# Patient Record
Sex: FEMALE | Race: ASIAN | NOT HISPANIC OR LATINO | Employment: UNEMPLOYED | URBAN - METROPOLITAN AREA
[De-identification: names, ages, dates, MRNs, and addresses within clinical notes are randomized per-mention and may not be internally consistent; named-entity substitution may affect disease eponyms.]

---

## 2021-01-05 ENCOUNTER — TELEPHONE (OUTPATIENT)
Dept: SCHEDULING | Facility: IMAGING CENTER | Age: 66
End: 2021-01-05

## 2021-02-02 ENCOUNTER — TELEPHONE (OUTPATIENT)
Dept: MEDICAL GROUP | Facility: PHYSICIAN GROUP | Age: 66
End: 2021-02-02

## 2021-02-02 NOTE — TELEPHONE ENCOUNTER
NEW PATIENT VISIT PRE-VISIT PLANNING    1.  [employer] Patient is checked in Patient Demographics?Yes    2.  Immunizations were updated in Epic using Reconcile Outside Information activity? No         3.  Is this appointment scheduled as a Hospital Follow-Up? No    4.  Patient is due for the following Health Maintenance Topics:   Health Maintenance Due   Topic Date Due   • HEPATITIS C SCREENING  1955   • PAP SMEAR  04/25/1978   • MAMMOGRAM  04/25/1997   • COLONOSCOPY  04/25/2007           5.  Reviewed/Updated the following with patient:       •   Preferred Pharmacy? Yes       •   Preferred Lab? Yes       •   Preferred Communication? Yes       •   Allergies? Yes       •   Medications? NO       •   Social History? Yes       •   Family History (document living status of immediate family members and if + hx of  cancer, diabetes, hypertension, hyperlipidemia, heart attack, stroke) Yes    6.  Updated Care Team?       •   DME Company (gait device, O2, CPAP, etc.) N\A       •   Other Specialists (eye doctor, derm, GYN, cardiology, endo, etc): N\A    7.  AHA (Puls[employer]) form printed for Provider? N/A

## 2021-02-04 ENCOUNTER — OFFICE VISIT (OUTPATIENT)
Dept: MEDICAL GROUP | Facility: PHYSICIAN GROUP | Age: 66
End: 2021-02-04
Payer: COMMERCIAL

## 2021-02-04 VITALS
HEIGHT: 61 IN | RESPIRATION RATE: 16 BRPM | DIASTOLIC BLOOD PRESSURE: 60 MMHG | BODY MASS INDEX: 22.84 KG/M2 | HEART RATE: 62 BPM | SYSTOLIC BLOOD PRESSURE: 100 MMHG | TEMPERATURE: 98.7 F | OXYGEN SATURATION: 98 % | WEIGHT: 121 LBS

## 2021-02-04 DIAGNOSIS — D69.6 THROMBOCYTOPENIA (HCC): ICD-10-CM

## 2021-02-04 DIAGNOSIS — E78.1 HYPERTRIGLYCERIDEMIA: ICD-10-CM

## 2021-02-04 DIAGNOSIS — R73.01 ELEVATED FASTING BLOOD SUGAR: ICD-10-CM

## 2021-02-04 DIAGNOSIS — Z78.0 POSTMENOPAUSAL ESTROGEN DEFICIENCY: ICD-10-CM

## 2021-02-04 DIAGNOSIS — Z11.59 SCREENING FOR VIRAL DISEASE: ICD-10-CM

## 2021-02-04 PROCEDURE — 99204 OFFICE O/P NEW MOD 45 MIN: CPT | Performed by: PHYSICIAN ASSISTANT

## 2021-02-04 ASSESSMENT — PATIENT HEALTH QUESTIONNAIRE - PHQ9: CLINICAL INTERPRETATION OF PHQ2 SCORE: 0

## 2021-02-04 NOTE — PROGRESS NOTES
Chief Complaint   Patient presents with   • Establish Care       HISTORY OF THE PRESENT ILLNESS: Briana Ochoa is a 65 y.o. female new patient to our practice. This pleasant patient is here today to establish care and to discuss the evaluation and management of:    Hypertriglyceridemia  Patient states her last lab work was completed 1 year ago indicated she had triglycerides in the 300 range.  She tells me that her diet could improve and she does not have regular exercise routine.  States she is more carbohydrates than she should.  Medication was not prescribed.    Elevated fasting blood sugar  States 2 years ago she was she had an elevated fasting glucose level.  She denies polyuria, polydipsia, poor wound healing, vision changes, peripheral neuropathy.  She is on carbohydrates than she should.    Thrombocytopenia (HCC)  Chronic but stable problem.  Patient states she has always had low platelets.  Denies alcohol use.  Denies chronic NSAID or aspirin use.  Patient is asymptomatic.    Postmenopausal estrogen deficiency  Patient is postmenopausal.  She denies taking over-the-counter vitamin D or calcium limitation.  She feels she gets adequate calcium in her diet.  Denies recurrent fractures.    Screening for viral disease  Pt would like to be screened for hep C.  She tells me that her mother has positive for hep C and lives within her household.  States she does help her mother monitor fasting blood glucose levels.  Patient is asymptomatic.  She meets CDC criteria to be screened.        History reviewed. No pertinent past medical history.    History reviewed. No pertinent surgical history.    Family Status   Relation Name Status   • Mo  Alive   • Fa     • Son  Alive   • Courtney  Alive     Family History   Problem Relation Age of Onset   • Heart Disease Mother    • Diabetes Father    • No Known Problems Son    • No Known Problems Daughter        Social History     Tobacco Use   • Smoking status: Never Smoker   •  "Smokeless tobacco: Never Used   Substance Use Topics   • Alcohol use: Not Currently   • Drug use: Not Currently       Allergies: Patient has no known allergies.    No current [facility]-ordered outpatient medications on file.     No current [facility]-ordered facility-administered medications on file.        Review of Systems   Constitutional: Negative for fever, chills, weight loss and malaise/fatigue.   HENT: Negative for ear pain, nosebleeds, congestion, sore throat and neck pain.    Eyes: Negative for blurred vision.   Respiratory: Negative for cough, sputum production, shortness of breath and wheezing.    Cardiovascular: Negative for chest pain, palpitations, orthopnea and leg swelling.   Gastrointestinal: Negative for heartburn, nausea, vomiting and abdominal pain.   Genitourinary: Negative for dysuria, urgency and frequency.   Musculoskeletal: Negative for myalgias, back pain and joint pain.   Skin: Negative for rash and itching.   Neurological: Negative for dizziness, tingling, tremors, sensory change, focal weakness and headaches.   Endo/Heme/Allergies: Does not bruise/bleed easily.   Psychiatric/Behavioral: Negative for depression, anxiety, or memory loss.     All other systems reviewed and are negative except as in HPI.    Exam: /60 (BP Location: Left arm, Patient Position: Sitting, BP Cuff Size: Adult)   Pulse 62   Temp 37.1 °C (98.7 °F) (Temporal)   Resp 16   Ht 1.549 m (5' 1\")   Wt 54.9 kg (121 lb)   SpO2 98%  Body mass index is 22.86 kg/m².  General: Normal appearing. No distress.  HEENT: Normocephalic. Eyes conjunctiva clear lids without ptosis, ears normal shape and contour.  Neck: Supple without JVD. Thyroid is not enlarged.  Pulmonary: Clear to ausculation.  Normal effort. No rales, ronchi, or wheezing.  Cardiovascular: Regular rate and rhythm without murmur.   Abdomen: Nondistended.  Neurologic: Grossly nonfocal  Skin: Warm and dry.  No obvious lesions.  Musculoskeletal: Normal gait. No extremity " cyanosis, clubbing, or edema.  Psych: Normal mood and affect. Alert and oriented x3. Judgment and insight is normal.      Medical decision-making and discussion:  1. Hypertriglyceridemia  Chronic problem.  Unknown if stable.  Lab work has been ordered.  Patient follow-up in 6 weeks discuss lab work results.    - Encouraged diet high in fruits, vegetables, and fiber. And a diet low in salt, refined carbohydrates, cholesterol, saturated fat, and trans fatty acids.    - Encouraged  a minimum of 30 minutes of moderate intensity aerobic exercise (eg, brisk walking) is recommended on five days each week. Or 20 minutes of vigorous-intensity aerobic exercise (eg, jogging) on three days each week.       - Comp Metabolic Panel; Future  - CBC WITH DIFFERENTIAL; Future  - Lipid Profile; Future    2. Elevated fasting blood sugar  Chronic problem.  Unknown if stable.  Lab work has been ordered.  Patient follow-up in 6 weeks discuss lab work results.    - Encouraged diet high in fruits, vegetables, and fiber. And a diet low in salt, refined carbohydrates, cholesterol, saturated fat, and trans fatty acids.    - Encouraged  a minimum of 30 minutes of moderate intensity aerobic exercise (eg, brisk walking) is recommended on five days each week. Or 20 minutes of vigorous-intensity aerobic exercise (eg, jogging) on three days each week.     - Comp Metabolic Panel; Future  - CBC WITH DIFFERENTIAL; Future  - Lipid Profile; Future  - HEMOGLOBIN A1C; Future    3. Thrombocytopenia (HCC)  Chronic but stable problem.  Lab work has been ordered.  Patient follow-up in 6 weeks discuss lab work results.  - CBC WITH DIFFERENTIAL; Future    4. Postmenopausal estrogen deficiency  Advised patient take over-the-counter 1-2000 units of vitamin D once daily.  She is not any 1200 mg of calcium in her diet to supplement with 500-1000 mg of calcium.  Continue weightbearing exercises.  Continue work on diet exercise.  Continue to monitor    - VITAMIN D,25  HYDROXY; Future    5. Screening for viral disease  Patient meets CDC criteria to be screened for hep C.  - HEP C VIRUS ANTIBODY; Future      Please note that this dictation was created using voice recognition software. I have made every reasonable attempt to correct obvious errors, but I expect that there are errors of grammar and possibly content that I did not discover before finalizing the note.      Assessment/Plan  1. Hypertriglyceridemia  Comp Metabolic Panel    CBC WITH DIFFERENTIAL    Lipid Profile   2. Elevated fasting blood sugar  Comp Metabolic Panel    CBC WITH DIFFERENTIAL    Lipid Profile    HEMOGLOBIN A1C   3. Thrombocytopenia (HCC)  CBC WITH DIFFERENTIAL   4. Postmenopausal estrogen deficiency  VITAMIN D,25 HYDROXY   5. Screening for viral disease  HEP C VIRUS ANTIBODY       No follow-ups on file.

## 2021-02-05 ENCOUNTER — HOSPITAL ENCOUNTER (OUTPATIENT)
Dept: LAB | Facility: MEDICAL CENTER | Age: 66
End: 2021-02-05
Attending: PHYSICIAN ASSISTANT
Payer: COMMERCIAL

## 2021-02-05 DIAGNOSIS — R73.01 ELEVATED FASTING BLOOD SUGAR: ICD-10-CM

## 2021-02-05 DIAGNOSIS — Z78.0 POSTMENOPAUSAL ESTROGEN DEFICIENCY: ICD-10-CM

## 2021-02-05 DIAGNOSIS — D69.6 THROMBOCYTOPENIA (HCC): ICD-10-CM

## 2021-02-05 DIAGNOSIS — E78.1 HYPERTRIGLYCERIDEMIA: ICD-10-CM

## 2021-02-05 DIAGNOSIS — Z11.59 SCREENING FOR VIRAL DISEASE: ICD-10-CM

## 2021-02-05 LAB
25(OH)D3 SERPL-MCNC: 56 NG/ML (ref 30–100)
ALBUMIN SERPL BCP-MCNC: 4.4 G/DL (ref 3.2–4.9)
ALBUMIN/GLOB SERPL: 1.3 G/DL
ALP SERPL-CCNC: 70 U/L (ref 30–99)
ALT SERPL-CCNC: 34 U/L (ref 2–50)
ANION GAP SERPL CALC-SCNC: 9 MMOL/L (ref 7–16)
AST SERPL-CCNC: 35 U/L (ref 12–45)
BASOPHILS # BLD AUTO: 0.5 % (ref 0–1.8)
BASOPHILS # BLD: 0.03 K/UL (ref 0–0.12)
BILIRUB SERPL-MCNC: 0.4 MG/DL (ref 0.1–1.5)
BUN SERPL-MCNC: 22 MG/DL (ref 8–22)
CALCIUM SERPL-MCNC: 9.3 MG/DL (ref 8.5–10.5)
CHLORIDE SERPL-SCNC: 102 MMOL/L (ref 96–112)
CHOLEST SERPL-MCNC: 272 MG/DL (ref 100–199)
CO2 SERPL-SCNC: 27 MMOL/L (ref 20–33)
CREAT SERPL-MCNC: 0.56 MG/DL (ref 0.5–1.4)
EOSINOPHIL # BLD AUTO: 0.1 K/UL (ref 0–0.51)
EOSINOPHIL NFR BLD: 1.7 % (ref 0–6.9)
ERYTHROCYTE [DISTWIDTH] IN BLOOD BY AUTOMATED COUNT: 42.9 FL (ref 35.9–50)
FASTING STATUS PATIENT QL REPORTED: NORMAL
GLOBULIN SER CALC-MCNC: 3.3 G/DL (ref 1.9–3.5)
GLUCOSE SERPL-MCNC: 85 MG/DL (ref 65–99)
HCT VFR BLD AUTO: 47.3 % (ref 37–47)
HCV AB SER QL: NORMAL
HDLC SERPL-MCNC: 53 MG/DL
HGB BLD-MCNC: 15.5 G/DL (ref 12–16)
IMM GRANULOCYTES # BLD AUTO: 0.01 K/UL (ref 0–0.11)
IMM GRANULOCYTES NFR BLD AUTO: 0.2 % (ref 0–0.9)
LDLC SERPL CALC-MCNC: 178 MG/DL
LYMPHOCYTES # BLD AUTO: 2.07 K/UL (ref 1–4.8)
LYMPHOCYTES NFR BLD: 35.1 % (ref 22–41)
MCH RBC QN AUTO: 30.9 PG (ref 27–33)
MCHC RBC AUTO-ENTMCNC: 32.8 G/DL (ref 33.6–35)
MCV RBC AUTO: 94.2 FL (ref 81.4–97.8)
MONOCYTES # BLD AUTO: 0.46 K/UL (ref 0–0.85)
MONOCYTES NFR BLD AUTO: 7.8 % (ref 0–13.4)
NEUTROPHILS # BLD AUTO: 3.23 K/UL (ref 2–7.15)
NEUTROPHILS NFR BLD: 54.7 % (ref 44–72)
NRBC # BLD AUTO: 0 K/UL
NRBC BLD-RTO: 0 /100 WBC
PLATELET # BLD AUTO: 155 K/UL (ref 164–446)
PMV BLD AUTO: 10.1 FL (ref 9–12.9)
POTASSIUM SERPL-SCNC: 3.8 MMOL/L (ref 3.6–5.5)
PROT SERPL-MCNC: 7.7 G/DL (ref 6–8.2)
RBC # BLD AUTO: 5.02 M/UL (ref 4.2–5.4)
SODIUM SERPL-SCNC: 138 MMOL/L (ref 135–145)
TRIGL SERPL-MCNC: 203 MG/DL (ref 0–149)
WBC # BLD AUTO: 5.9 K/UL (ref 4.8–10.8)

## 2021-02-05 PROCEDURE — 36415 COLL VENOUS BLD VENIPUNCTURE: CPT

## 2021-02-05 PROCEDURE — 80061 LIPID PANEL: CPT

## 2021-02-05 PROCEDURE — 86803 HEPATITIS C AB TEST: CPT

## 2021-02-05 PROCEDURE — 82306 VITAMIN D 25 HYDROXY: CPT

## 2021-02-05 PROCEDURE — 85025 COMPLETE CBC W/AUTO DIFF WBC: CPT

## 2021-02-05 PROCEDURE — 83036 HEMOGLOBIN GLYCOSYLATED A1C: CPT

## 2021-02-05 PROCEDURE — 80053 COMPREHEN METABOLIC PANEL: CPT

## 2021-02-06 LAB
EST. AVERAGE GLUCOSE BLD GHB EST-MCNC: 134 MG/DL
HBA1C MFR BLD: 6.3 % (ref 0–5.6)

## 2021-03-17 ENCOUNTER — OFFICE VISIT (OUTPATIENT)
Dept: MEDICAL GROUP | Facility: PHYSICIAN GROUP | Age: 66
End: 2021-03-17
Payer: COMMERCIAL

## 2021-03-17 ENCOUNTER — HOSPITAL ENCOUNTER (OUTPATIENT)
Dept: LAB | Facility: MEDICAL CENTER | Age: 66
End: 2021-03-17
Attending: PHYSICIAN ASSISTANT
Payer: COMMERCIAL

## 2021-03-17 VITALS
HEIGHT: 61 IN | TEMPERATURE: 98 F | WEIGHT: 117 LBS | OXYGEN SATURATION: 96 % | RESPIRATION RATE: 20 BRPM | DIASTOLIC BLOOD PRESSURE: 66 MMHG | BODY MASS INDEX: 22.09 KG/M2 | HEART RATE: 76 BPM | SYSTOLIC BLOOD PRESSURE: 106 MMHG

## 2021-03-17 DIAGNOSIS — E78.2 MIXED HYPERLIPIDEMIA: ICD-10-CM

## 2021-03-17 DIAGNOSIS — Z13.29 SCREENING FOR THYROID DISORDER: ICD-10-CM

## 2021-03-17 DIAGNOSIS — T78.3XXA ANGIOEDEMA OF LIPS, INITIAL ENCOUNTER: ICD-10-CM

## 2021-03-17 DIAGNOSIS — D69.6 THROMBOCYTOPENIA (HCC): ICD-10-CM

## 2021-03-17 DIAGNOSIS — R73.03 PREDIABETES: ICD-10-CM

## 2021-03-17 LAB
CRP SERPL HS-MCNC: <0.3 MG/DL (ref 0–0.75)
ERYTHROCYTE [SEDIMENTATION RATE] IN BLOOD BY WESTERGREN METHOD: 17 MM/HOUR (ref 0–30)

## 2021-03-17 PROCEDURE — 85652 RBC SED RATE AUTOMATED: CPT

## 2021-03-17 PROCEDURE — 99214 OFFICE O/P EST MOD 30 MIN: CPT | Performed by: PHYSICIAN ASSISTANT

## 2021-03-17 PROCEDURE — 86160 COMPLEMENT ANTIGEN: CPT

## 2021-03-17 PROCEDURE — 36415 COLL VENOUS BLD VENIPUNCTURE: CPT

## 2021-03-17 PROCEDURE — 86140 C-REACTIVE PROTEIN: CPT

## 2021-03-17 PROCEDURE — 84443 ASSAY THYROID STIM HORMONE: CPT

## 2021-03-17 RX ORDER — VITAMIN B COMPLEX
1000 TABLET ORAL DAILY
COMMUNITY

## 2021-03-17 RX ORDER — LANCETS 33 GAUGE
1 EACH MISCELLANEOUS
Qty: 100 EACH | Refills: 3 | Status: SHIPPED | OUTPATIENT
Start: 2021-03-17

## 2021-03-17 RX ORDER — LANCETS 33 GAUGE
1 EACH MISCELLANEOUS
COMMUNITY
End: 2021-03-17 | Stop reason: SDUPTHER

## 2021-03-17 RX ORDER — EPINEPHRINE 0.3 MG/.3ML
0.3 INJECTION SUBCUTANEOUS ONCE
Qty: 1 EACH | Refills: 0 | Status: SHIPPED | OUTPATIENT
Start: 2021-03-17 | End: 2021-07-01

## 2021-03-17 RX ORDER — ATORVASTATIN CALCIUM 20 MG/1
20 TABLET, FILM COATED ORAL EVERY EVENING
Qty: 90 TABLET | Refills: 3 | Status: SHIPPED | OUTPATIENT
Start: 2021-03-17

## 2021-03-17 ASSESSMENT — FIBROSIS 4 INDEX: FIB4 SCORE: 2.44

## 2021-03-17 NOTE — PROGRESS NOTES
Chief Complaint   Patient presents with   • Follow-Up     labs       HISTORY OF PRESENT ILLNESS: Briana Ochoa is an established 65 y.o. female here to discuss the evaluation and management of:    Prediabetes  Chronic problem.  New diagnosis.  Discussed recent hemoglobin A1c results with patient.  Hemoglobin A1c on 2/5/2021 was 6.3.  Patient tells me that she eats more rice than she should.  States she eats rice twice a day at lunch and dinner and each time he eats at least 2+ cups of rice.  Exercise routine consist of walking 1-2 times a week 1/2-hour per time.  She denies getting her heart rate up and breaking a sweat.  She denies polyuria, polydipsia, polyphagia, poor wound healing, vision changes, peripheral neuropathy.     Mixed hyperlipidemia  Chronic problem.  Could be better controlled.  Lipitor 20 mg tab once daily.  States she restarted medication 3 days ago.  Denies side effects or complications from medication.  Admits that her exercise could improve as well as her diet.    Thrombocytopenia (HCC)  Chronic but stable problem.  Discussed recent CBC lab work results with patient. Patient states she has always had low platelets.  Denies alcohol use.  Denies chronic NSAID or aspirin use.  Patient is asymptomatic    Angioedema of lips, initial encounter  Chronic problem.  Patient states for the past 1+ years she has been experiencing intermittent episodes of lip swelling.  States episodes occur on average 2 times per month.  States symptoms occur overnight while she is sleeping.  States top and bottom lip will be swollen times can take anywhere from 2 days to 1 week to resolve.  Denies other associated symptoms.  She denies pain.  Denies anaphylactic symptoms.  States she has not been able to pinpoint a known trigger.  No new lotion, soap, detergent, sheets, towels, food, topical or oral medicine. No similarly affected contacts.       Patient Active Problem List    Diagnosis Date Noted   • Hypertriglyceridemia  2021   • Thrombocytopenia (HCC) 2021   • Elevated fasting blood sugar 2021       Allergies:Patient has no known allergies.    Current Outpatient Medications   Medication Sig Dispense Refill   • atorvastatin (LIPITOR) 20 MG Tab Take 1 tablet by mouth every evening. 90 tablet 3   • EPINEPHrine (EPIPEN) 0.3 MG/0.3ML Solution Auto-injector solution for injection Inject 0.3 mL into the shoulder, thigh, or buttocks one time for 1 dose. 1 Each 0   • OneTouch Delica Lancets 33G Misc 1 Container 1 time a day as needed. Use to check blood sugar twice daily 100 Each 3   • glucose blood strip 1 Strip by Other route as needed. Use to test ad directed 100 Strip 3     No current facility-administered medications for this visit.       Social History     Tobacco Use   • Smoking status: Never Smoker   • Smokeless tobacco: Never Used   Substance Use Topics   • Alcohol use: Not Currently   • Drug use: Not Currently       Family Status   Relation Name Status   • Mo  Alive   • Fa     • Son  Alive   • Courtney  Alive     Family History   Problem Relation Age of Onset   • Heart Disease Mother    • Diabetes Father    • No Known Problems Son    • No Known Problems Daughter        ROS:  Review of Systems   Constitutional: Negative for fever, chills, weight loss and malaise/fatigue.   HENT: Negative for ear pain, nosebleeds, congestion, sore throat and neck pain.    Eyes: Negative for blurred vision.   Respiratory: Negative for cough, sputum production, shortness of breath and wheezing.    Cardiovascular: Negative for chest pain, palpitations, orthopnea and leg swelling.   Gastrointestinal: Negative for heartburn, nausea, vomiting and abdominal pain.   Genitourinary: Negative for dysuria, urgency and frequency.   Musculoskeletal: Negative for myalgias, back pain and joint pain.   Skin: Negative for rash and itching.   Neurological: Negative for dizziness, tingling, tremors, sensory change, focal weakness and headaches.  "  Endo/Heme/Allergies: Does not bruise/bleed easily.   Psychiatric/Behavioral: Negative for depression, suicidal ideas and memory loss.  The patient is not nervous/anxious and does not have insomnia.    All other systems reviewed and are negative except as in HPI.    Exam: /66 (BP Location: Left arm, Patient Position: Sitting, BP Cuff Size: Adult)   Pulse 76   Temp 36.7 °C (98 °F) (Temporal)   Resp 20   Ht 1.549 m (5' 1\")   Wt 53.1 kg (117 lb)   SpO2 96%  Body mass index is 22.11 kg/m².  General: Normal appearing. No distress.  HEENT: Normocephalic. Eyes conjunctiva clear lids without ptosis, ears normal shape and contour.  Neck: Supple without JVD. Thyroid is not enlarged.  Pulmonary: Clear to ausculation.  Normal effort. No rales, ronchi, or wheezing.  Cardiovascular: Regular rate and rhythm without murmur.   Abdomen: Nondistended.   Neurologic: Grossly nonfocal.  Cranial nerves are normal.  Skin: Warm and dry.  No rashes or suspicious skin lesions.  Musculoskeletal: Normal gait. No extremity cyanosis, clubbing, or edema.  Psych: Normal mood and affect. Alert and oriented x3. Judgment and insight is normal.    Medical decision-making and discussion:  1. Prediabetes  New diagnosis.  Discussed recent hemoglobin A1c results with patient.  Positive for prediabetes.  Advised patient continue working on diet and exercise.  Decrease carbohydrate consumption.  Advised patient when she is exercising to make sure that she is getting her heart rate up and breaking a sweat.    - Encouraged diet high in fruits, vegetables, and fiber. And a diet low in salt, refined carbohydrates, cholesterol, saturated fat, and trans fatty acids.    - Encouraged  a minimum of 30 minutes of moderate intensity aerobic exercise (eg, brisk walking) is recommended on five days each week. Or 20 minutes of vigorous-intensity aerobic exercise (eg, jogging) on three days each week.       - HEMOGLOBIN A1C; Future  - MICROALB/CREAT RATIO " RAND. UR  - Lipid Profile; Future  - OneTouch Delica Lancets 33G Misc; 1 Container 1 time a day as needed. Use to check blood sugar twice daily  Dispense: 100 Each; Refill: 3  - glucose blood strip; 1 Strip by Other route as needed. Use to test ad directed  Dispense: 100 Strip; Refill: 3    2. Mixed hyperlipidemia  Chronic problem.  Could be better controlled.  Continue statin medication.   Continue to monitor.  Patient repeat lab work in 3 months for evaluation.    Patient 10-year ASCVD and CVD risk scores are low.  - atorvastatin (LIPITOR) 20 MG Tab; Take 1 tablet by mouth every evening.  Dispense: 90 tablet; Refill: 3  - HEMOGLOBIN A1C; Future  - Lipid Profile; Future    3. Thrombocytopenia (HCC)  Chronic but stable problem.  Discussed recent CBC lab work results with patient.  Continue to monitor.    4. Angioedema of lips, initial encounter  Known etiology.  Lab work has been ordered.  Patient has been prescribed EpiPen's and given instructions on how to use EpiPen's if she develops anaphylactic symptoms.  Discussed ED precautions with patient.  Patient has been referred to an allergist for further evaluation.    Follow-up for worsening symptoms,lack of expected recovery, or should new symptoms or problems arise.      - REFERRAL TO ALLERGY  - EPINEPHrine (EPIPEN) 0.3 MG/0.3ML Solution Auto-injector solution for injection; Inject 0.3 mL into the shoulder, thigh, or buttocks one time for 1 dose.  Dispense: 1 Each; Refill: 0  - Sed Rate; Future  - CRP QUANTITIVE (NON-CARDIAC); Future  - COMPLEMENT C4; Future  - TSH WITH REFLEX TO FT4; Future    5. Screening for thyroid disorder    - TSH WITH REFLEX TO FT4; Future      Please note that this dictation was created using voice recognition software. I have made every reasonable attempt to correct obvious errors, but I expect that there are errors of grammar and possibly content that I did not discover before finalizing the note.    Assessment/Plan:  1. Prediabetes   HEMOGLOBIN A1C    MICROALB/CREAT RATIO RAND. UR    Lipid Profile    OneTouch Delica Lancets 33G Misc    glucose blood strip   2. Mixed hyperlipidemia  atorvastatin (LIPITOR) 20 MG Tab    HEMOGLOBIN A1C    Lipid Profile   3. Thrombocytopenia (HCC)     4. Angioedema of lips, initial encounter  REFERRAL TO ALLERGY    EPINEPHrine (EPIPEN) 0.3 MG/0.3ML Solution Auto-injector solution for injection    Sed Rate    CRP QUANTITIVE (NON-CARDIAC)    COMPLEMENT C4    TSH WITH REFLEX TO FT4   5. Screening for thyroid disorder  TSH WITH REFLEX TO FT4       Return in about 3 months (around 6/17/2021) for labs.

## 2021-03-18 DIAGNOSIS — R73.03 PREDIABETES: ICD-10-CM

## 2021-03-18 LAB
C4 SERPL-MCNC: 19.8 MG/DL (ref 19–52)
TSH SERPL DL<=0.005 MIU/L-ACNC: 2.98 UIU/ML (ref 0.38–5.33)

## 2021-03-24 ENCOUNTER — IMMUNIZATION (OUTPATIENT)
Dept: FAMILY PLANNING/WOMEN'S HEALTH CLINIC | Facility: IMMUNIZATION CENTER | Age: 66
End: 2021-03-24
Payer: COMMERCIAL

## 2021-03-24 DIAGNOSIS — Z23 ENCOUNTER FOR VACCINATION: Primary | ICD-10-CM

## 2021-03-24 PROCEDURE — 0001A PFIZER SARS-COV-2 VACCINE: CPT

## 2021-03-24 PROCEDURE — 91300 PFIZER SARS-COV-2 VACCINE: CPT

## 2021-04-15 ENCOUNTER — IMMUNIZATION (OUTPATIENT)
Dept: FAMILY PLANNING/WOMEN'S HEALTH CLINIC | Facility: IMMUNIZATION CENTER | Age: 66
End: 2021-04-15
Attending: INTERNAL MEDICINE
Payer: COMMERCIAL

## 2021-04-15 DIAGNOSIS — Z23 ENCOUNTER FOR VACCINATION: Primary | ICD-10-CM

## 2021-04-15 PROCEDURE — 0002A PFIZER SARS-COV-2 VACCINE: CPT | Performed by: INTERNAL MEDICINE

## 2021-04-15 PROCEDURE — 91300 PFIZER SARS-COV-2 VACCINE: CPT | Performed by: INTERNAL MEDICINE

## 2021-04-23 PROBLEM — R73.03 PREDIABETES: Status: ACTIVE | Noted: 2021-04-23

## 2021-04-23 PROBLEM — E78.2 MIXED HYPERLIPIDEMIA: Status: ACTIVE | Noted: 2021-04-23

## 2021-07-01 DIAGNOSIS — T78.3XXA ANGIOEDEMA OF LIPS, INITIAL ENCOUNTER: ICD-10-CM

## 2021-07-01 RX ORDER — EPINEPHRINE 0.3 MG/.3ML
INJECTION SUBCUTANEOUS
Qty: 2 EACH | Refills: 0 | Status: SHIPPED | OUTPATIENT
Start: 2021-07-01

## 2022-10-03 ENCOUNTER — NON-PROVIDER VISIT (OUTPATIENT)
Dept: OCCUPATIONAL MEDICINE | Facility: CLINIC | Age: 67
End: 2022-10-03

## 2022-10-03 DIAGNOSIS — Z11.1 ENCOUNTER FOR PPD TEST: ICD-10-CM

## 2022-10-03 PROCEDURE — 86580 TB INTRADERMAL TEST: CPT | Performed by: NURSE PRACTITIONER

## 2022-10-05 ENCOUNTER — NON-PROVIDER VISIT (OUTPATIENT)
Dept: OCCUPATIONAL MEDICINE | Facility: CLINIC | Age: 67
End: 2022-10-05

## 2022-10-05 DIAGNOSIS — Z11.1 ENCOUNTER FOR PPD SKIN TEST READING: ICD-10-CM

## 2022-10-06 LAB — TB WHEAL 3D P 5 TU DIAM: NORMAL MM

## 2023-09-18 ENCOUNTER — NON-PROVIDER VISIT (OUTPATIENT)
Dept: OCCUPATIONAL MEDICINE | Facility: CLINIC | Age: 68
End: 2023-09-18

## 2023-09-18 DIAGNOSIS — Z11.1 ENCOUNTER FOR PPD TEST: Primary | ICD-10-CM

## 2023-09-18 PROCEDURE — 86580 TB INTRADERMAL TEST: CPT | Performed by: NURSE PRACTITIONER

## 2023-09-20 ENCOUNTER — NON-PROVIDER VISIT (OUTPATIENT)
Dept: OCCUPATIONAL MEDICINE | Facility: CLINIC | Age: 68
End: 2023-09-20

## 2023-09-20 LAB — TB WHEAL 3D P 5 TU DIAM: NORMAL MM

## 2023-09-20 NOTE — PROGRESS NOTES
Briana Ochoa is a 68 y.o. female here for a non-provider visit for PPD reading -- Step 1 of 1.      1.  Resulted in Epic under enter/edit results? Yes   2.  TB evaluation questionnaire scanned into chart and original given to patient?Yes      3. Was induration greater than 0 mm? No.